# Patient Record
Sex: FEMALE | Race: WHITE | Employment: PART TIME | URBAN - METROPOLITAN AREA
[De-identification: names, ages, dates, MRNs, and addresses within clinical notes are randomized per-mention and may not be internally consistent; named-entity substitution may affect disease eponyms.]

---

## 2017-01-24 ENCOUNTER — TELEPHONE (OUTPATIENT)
Dept: PRIMARY CARE CLINIC | Age: 42
End: 2017-01-24

## 2017-01-27 ENCOUNTER — OFFICE VISIT (OUTPATIENT)
Dept: PRIMARY CARE CLINIC | Age: 42
End: 2017-01-27

## 2017-01-27 VITALS
OXYGEN SATURATION: 100 % | BODY MASS INDEX: 24.05 KG/M2 | HEART RATE: 76 BPM | WEIGHT: 149 LBS | DIASTOLIC BLOOD PRESSURE: 80 MMHG | SYSTOLIC BLOOD PRESSURE: 120 MMHG

## 2017-01-27 DIAGNOSIS — E03.9 ACQUIRED HYPOTHYROIDISM: Primary | ICD-10-CM

## 2017-01-27 DIAGNOSIS — R63.5 WEIGHT GAIN: ICD-10-CM

## 2017-01-27 PROCEDURE — 99214 OFFICE O/P EST MOD 30 MIN: CPT | Performed by: INTERNAL MEDICINE

## 2017-01-28 LAB — TSH SERPL DL<=0.05 MIU/L-ACNC: 3.02 UIU/ML (ref 0.27–4.2)

## 2017-01-29 ENCOUNTER — TELEPHONE (OUTPATIENT)
Dept: PRIMARY CARE CLINIC | Age: 42
End: 2017-01-29

## 2017-01-29 DIAGNOSIS — E03.9 ACQUIRED HYPOTHYROIDISM: Primary | ICD-10-CM

## 2017-01-30 PROBLEM — E03.9 ACQUIRED HYPOTHYROIDISM: Status: ACTIVE | Noted: 2017-01-30

## 2017-01-30 RX ORDER — LEVOTHYROXINE SODIUM 112 UG/1
112 TABLET ORAL DAILY
Qty: 30 TABLET | Refills: 1 | Status: SHIPPED | OUTPATIENT
Start: 2017-01-30 | End: 2017-10-09

## 2017-04-03 DIAGNOSIS — E03.9 ACQUIRED HYPOTHYROIDISM: ICD-10-CM

## 2017-04-04 ENCOUNTER — TELEPHONE (OUTPATIENT)
Dept: PRIMARY CARE CLINIC | Age: 42
End: 2017-04-04

## 2017-04-04 DIAGNOSIS — E03.9 ACQUIRED HYPOTHYROIDISM: Primary | ICD-10-CM

## 2017-04-04 LAB — TSH SERPL DL<=0.05 MIU/L-ACNC: 4.58 UIU/ML (ref 0.27–4.2)

## 2017-04-04 RX ORDER — LEVOTHYROXINE SODIUM 0.12 MG/1
125 TABLET ORAL DAILY
Qty: 30 TABLET | Refills: 1 | Status: SHIPPED | OUTPATIENT
Start: 2017-04-04 | End: 2017-05-31 | Stop reason: SDUPTHER

## 2017-04-05 RX ORDER — CITALOPRAM 40 MG/1
TABLET ORAL
Qty: 90 TABLET | Refills: 1 | Status: SHIPPED | OUTPATIENT
Start: 2017-04-05 | End: 2017-09-20 | Stop reason: SDUPTHER

## 2017-04-18 ENCOUNTER — OFFICE VISIT (OUTPATIENT)
Dept: ORTHOPEDIC SURGERY | Age: 42
End: 2017-04-18

## 2017-04-18 ENCOUNTER — HOSPITAL ENCOUNTER (OUTPATIENT)
Dept: GENERAL RADIOLOGY | Facility: MEDICAL CENTER | Age: 42
Discharge: HOME | End: 2017-04-18
Attending: ORTHOPAEDIC SURGERY | Admitting: ORTHOPAEDIC SURGERY

## 2017-04-18 VITALS
WEIGHT: 145 LBS | HEART RATE: 59 BPM | DIASTOLIC BLOOD PRESSURE: 74 MMHG | HEIGHT: 65 IN | SYSTOLIC BLOOD PRESSURE: 116 MMHG | BODY MASS INDEX: 24.16 KG/M2

## 2017-04-18 DIAGNOSIS — M25.561 CHRONIC PAIN OF RIGHT KNEE: Primary | ICD-10-CM

## 2017-04-18 DIAGNOSIS — M25.562 CHRONIC PAIN OF LEFT KNEE: ICD-10-CM

## 2017-04-18 DIAGNOSIS — G89.29 CHRONIC PAIN OF LEFT KNEE: ICD-10-CM

## 2017-04-18 DIAGNOSIS — M76.32 IT BAND SYNDROME, LEFT: ICD-10-CM

## 2017-04-18 DIAGNOSIS — M22.41 CHONDROMALACIA PATELLA, RIGHT: ICD-10-CM

## 2017-04-18 DIAGNOSIS — M22.2X1 PATELLOFEMORAL ARTHRALGIA OF BOTH KNEES: ICD-10-CM

## 2017-04-18 DIAGNOSIS — M22.42 CHONDROMALACIA PATELLA, LEFT: ICD-10-CM

## 2017-04-18 DIAGNOSIS — M22.2X2 PATELLOFEMORAL ARTHRALGIA OF BOTH KNEES: ICD-10-CM

## 2017-04-18 DIAGNOSIS — G89.29 CHRONIC PAIN OF RIGHT KNEE: ICD-10-CM

## 2017-04-18 DIAGNOSIS — M25.561 CHRONIC PAIN OF RIGHT KNEE: ICD-10-CM

## 2017-04-18 DIAGNOSIS — M76.31 IT BAND SYNDROME, RIGHT: ICD-10-CM

## 2017-04-18 DIAGNOSIS — M62.9 HAMSTRING TIGHTNESS OF BOTH LOWER EXTREMITIES: ICD-10-CM

## 2017-04-18 DIAGNOSIS — G89.29 CHRONIC PAIN OF RIGHT KNEE: Primary | ICD-10-CM

## 2017-04-18 PROCEDURE — 99203 OFFICE O/P NEW LOW 30 MIN: CPT | Performed by: PHYSICIAN ASSISTANT

## 2017-04-18 PROCEDURE — 73562 X-RAY EXAM OF KNEE 3: CPT | Performed by: PHYSICIAN ASSISTANT

## 2017-06-01 DIAGNOSIS — E03.9 ACQUIRED HYPOTHYROIDISM: ICD-10-CM

## 2017-06-01 LAB — TSH SERPL DL<=0.05 MIU/L-ACNC: 2.32 UIU/ML (ref 0.27–4.2)

## 2017-06-01 RX ORDER — LEVOTHYROXINE SODIUM 0.12 MG/1
TABLET ORAL
Qty: 30 TABLET | Refills: 11 | Status: SHIPPED | OUTPATIENT
Start: 2017-06-01 | End: 2017-06-23 | Stop reason: SDUPTHER

## 2017-06-23 RX ORDER — LEVOTHYROXINE SODIUM 0.12 MG/1
TABLET ORAL
Qty: 90 TABLET | Refills: 3 | Status: SHIPPED | OUTPATIENT
Start: 2017-06-23 | End: 2018-08-16 | Stop reason: SDUPTHER

## 2017-06-26 ENCOUNTER — TELEPHONE (OUTPATIENT)
Dept: PRIMARY CARE CLINIC | Age: 42
End: 2017-06-26

## 2017-09-21 RX ORDER — CITALOPRAM 40 MG/1
TABLET ORAL
Qty: 90 TABLET | Refills: 1 | Status: SHIPPED | OUTPATIENT
Start: 2017-09-21 | End: 2018-03-22 | Stop reason: SDUPTHER

## 2017-10-09 ENCOUNTER — OFFICE VISIT (OUTPATIENT)
Dept: PRIMARY CARE CLINIC | Age: 42
End: 2017-10-09

## 2017-10-09 VITALS
HEART RATE: 64 BPM | SYSTOLIC BLOOD PRESSURE: 104 MMHG | BODY MASS INDEX: 25.23 KG/M2 | WEIGHT: 151.6 LBS | DIASTOLIC BLOOD PRESSURE: 62 MMHG

## 2017-10-09 DIAGNOSIS — M25.562 ACUTE PAIN OF BOTH KNEES: Primary | ICD-10-CM

## 2017-10-09 DIAGNOSIS — E03.9 ACQUIRED HYPOTHYROIDISM: ICD-10-CM

## 2017-10-09 DIAGNOSIS — M25.462 EFFUSION, LEFT KNEE: ICD-10-CM

## 2017-10-09 DIAGNOSIS — M25.561 ACUTE PAIN OF BOTH KNEES: Primary | ICD-10-CM

## 2017-10-09 LAB
BASOPHILS ABSOLUTE: 0 K/UL (ref 0–0.2)
BASOPHILS RELATIVE PERCENT: 0.6 %
EOSINOPHILS ABSOLUTE: 0.3 K/UL (ref 0–0.6)
EOSINOPHILS RELATIVE PERCENT: 6.5 %
HCT VFR BLD CALC: 39.5 % (ref 36–48)
HEMOGLOBIN: 13.5 G/DL (ref 12–16)
LYMPHOCYTES ABSOLUTE: 1.2 K/UL (ref 1–5.1)
LYMPHOCYTES RELATIVE PERCENT: 23.3 %
MCH RBC QN AUTO: 31.1 PG (ref 26–34)
MCHC RBC AUTO-ENTMCNC: 34.2 G/DL (ref 31–36)
MCV RBC AUTO: 91.1 FL (ref 80–100)
MONOCYTES ABSOLUTE: 0.4 K/UL (ref 0–1.3)
MONOCYTES RELATIVE PERCENT: 7.7 %
NEUTROPHILS ABSOLUTE: 3.1 K/UL (ref 1.7–7.7)
NEUTROPHILS RELATIVE PERCENT: 61.9 %
PDW BLD-RTO: 12.8 % (ref 12.4–15.4)
PLATELET # BLD: 235 K/UL (ref 135–450)
PMV BLD AUTO: 7.8 FL (ref 5–10.5)
RBC # BLD: 4.34 M/UL (ref 4–5.2)
SEDIMENTATION RATE, ERYTHROCYTE: 8 MM/HR (ref 0–20)
WBC # BLD: 5 K/UL (ref 4–11)

## 2017-10-09 PROCEDURE — 99214 OFFICE O/P EST MOD 30 MIN: CPT | Performed by: INTERNAL MEDICINE

## 2017-10-09 NOTE — PROGRESS NOTES
10/09/17 151 lb 9.6 oz (68.8 kg)   04/18/17 145 lb (65.8 kg)   01/27/17 149 lb (67.6 kg)     BP Readings from Last 3 Encounters:   10/09/17 104/62   04/18/17 116/74   01/27/17 120/80         /62 (Site: Left Arm, Position: Sitting, Cuff Size: Medium Adult)   Pulse 64   Wt 151 lb 9.6 oz (68.8 kg)   BMI 25.23 kg/m²   General appearance: alert, appears stated age and cooperative  Eyes: negative findings: lids and lashes normal and conjunctivae and sclerae normal  Throat: lips, mucosa, and tongue normal; teeth and gums normal  Neck: no adenopathy, supple, symmetrical, trachea midline and thyroid not enlarged, symmetric, no tenderness/mass/nodules  Lungs: clear to auscultation bilaterally  Msk: warm left knee. Small effusion. No erythema. No evidence of synovitis both hands. Antalgic gait  Neurologic: Mental status: Alert, oriented, thought content appropriate  Cranial nerves: normal  Skin: Skin is warm and dry. [place] Psychiatric: normal mood and affect. speech is normal and behavior is normal. Judgment, cognition and memory are normal.     not applicable    Assessment and Plan  1. Acute pain of both knees  Sudden onset 6 months ago-no prior hx and effusion today  Also remote hx of bilateral hand pain  Concerning for inflammatory arthritis  Discussed my concerns with patient  rec f/u with rheum   - XR KNEE LEFT (3 VIEWS)  - XR Knee Right 3VW  - José Smart MD (Rheumatology)    2. Effusion, left knee    - Sedimentation Rate  - C-Reactive Protein  - Comprehensive Metabolic Panel  - CBC Auto Differential  - RHEUMATOID FACTOR  - CYCLIC CITRUL PEPTIDE ANTIBODY, IGG  - XR KNEE LEFT (3 VIEWS)  - José Costa MD (Rheumatology)    3.  Acquired hypothyroidism

## 2017-10-10 LAB
A/G RATIO: 1.8 (ref 1.1–2.2)
ALBUMIN SERPL-MCNC: 4.6 G/DL (ref 3.4–5)
ALP BLD-CCNC: 69 U/L (ref 40–129)
ALT SERPL-CCNC: 12 U/L (ref 10–40)
ANION GAP SERPL CALCULATED.3IONS-SCNC: 14 MMOL/L (ref 3–16)
AST SERPL-CCNC: 17 U/L (ref 15–37)
BILIRUB SERPL-MCNC: 0.3 MG/DL (ref 0–1)
BUN BLDV-MCNC: 10 MG/DL (ref 7–20)
C-REACTIVE PROTEIN: 0.7 MG/L (ref 0–5.1)
CALCIUM SERPL-MCNC: 9.7 MG/DL (ref 8.3–10.6)
CHLORIDE BLD-SCNC: 98 MMOL/L (ref 99–110)
CO2: 27 MMOL/L (ref 21–32)
CREAT SERPL-MCNC: 0.8 MG/DL (ref 0.6–1.1)
GFR AFRICAN AMERICAN: >60
GFR NON-AFRICAN AMERICAN: >60
GLOBULIN: 2.5 G/DL
GLUCOSE BLD-MCNC: 92 MG/DL (ref 70–99)
POTASSIUM SERPL-SCNC: 4 MMOL/L (ref 3.5–5.1)
RHEUMATOID FACTOR: <10 IU/ML
SODIUM BLD-SCNC: 139 MMOL/L (ref 136–145)
TOTAL PROTEIN: 7.1 G/DL (ref 6.4–8.2)

## 2017-10-11 ENCOUNTER — TELEPHONE (OUTPATIENT)
Dept: PRIMARY CARE CLINIC | Age: 42
End: 2017-10-11

## 2017-10-11 LAB — CCP IGG ANTIBODIES: 8 UNITS (ref 0–19)

## 2017-12-12 ENCOUNTER — OFFICE VISIT (OUTPATIENT)
Dept: ORTHOPEDIC SURGERY | Age: 42
End: 2017-12-12

## 2017-12-12 VITALS
DIASTOLIC BLOOD PRESSURE: 67 MMHG | SYSTOLIC BLOOD PRESSURE: 111 MMHG | HEIGHT: 65 IN | HEART RATE: 78 BPM | BODY MASS INDEX: 25.27 KG/M2 | WEIGHT: 151.68 LBS

## 2017-12-12 DIAGNOSIS — G89.29 CHRONIC PAIN OF LEFT KNEE: ICD-10-CM

## 2017-12-12 DIAGNOSIS — M71.21 BAKER'S CYST OF KNEE, RIGHT: ICD-10-CM

## 2017-12-12 DIAGNOSIS — M22.2X2 PATELLOFEMORAL ARTHRALGIA OF BOTH KNEES: ICD-10-CM

## 2017-12-12 DIAGNOSIS — M22.42 CHONDROMALACIA PATELLA, LEFT: ICD-10-CM

## 2017-12-12 DIAGNOSIS — M22.2X1 PATELLOFEMORAL ARTHRALGIA OF BOTH KNEES: ICD-10-CM

## 2017-12-12 DIAGNOSIS — G89.29 CHRONIC PAIN OF RIGHT KNEE: Primary | ICD-10-CM

## 2017-12-12 DIAGNOSIS — M25.561 CHRONIC PAIN OF RIGHT KNEE: Primary | ICD-10-CM

## 2017-12-12 DIAGNOSIS — M25.562 CHRONIC PAIN OF LEFT KNEE: ICD-10-CM

## 2017-12-12 DIAGNOSIS — M22.41 CHONDROMALACIA PATELLA, RIGHT: ICD-10-CM

## 2017-12-12 DIAGNOSIS — M71.22 BAKER'S CYST OF KNEE, LEFT: ICD-10-CM

## 2017-12-12 DIAGNOSIS — M62.9 HAMSTRING TIGHTNESS OF BOTH LOWER EXTREMITIES: ICD-10-CM

## 2017-12-12 PROBLEM — M76.31 IT BAND SYNDROME, RIGHT: Status: ACTIVE | Noted: 2017-12-12

## 2017-12-12 PROBLEM — M76.32 IT BAND SYNDROME, LEFT: Status: ACTIVE | Noted: 2017-12-12

## 2017-12-12 PROCEDURE — 20610 DRAIN/INJ JOINT/BURSA W/O US: CPT | Performed by: PHYSICIAN ASSISTANT

## 2017-12-12 PROCEDURE — 99999 PR OFFICE/OUTPT VISIT,PROCEDURE ONLY: CPT | Performed by: PHYSICIAN ASSISTANT

## 2017-12-12 RX ORDER — MELOXICAM 15 MG/1
15 TABLET ORAL DAILY
Qty: 30 TABLET | Refills: 2 | Status: SHIPPED | OUTPATIENT
Start: 2017-12-12 | End: 2018-03-21

## 2017-12-12 NOTE — PROGRESS NOTES
MG INJ   2. Chondromalacia patella, right  PA ARTHROCENTESIS ASPIR&/INJ MAJOR JT/BURSA W/O US    PA METHYLPREDNISOLONE 40 MG INJ   3. Baker's cyst of knee, right     4. Chronic pain of left knee  PA ARTHROCENTESIS ASPIR&/INJ MAJOR JT/BURSA W/O US    PA METHYLPREDNISOLONE 40 MG INJ   5. Chondromalacia patella, left  PA ARTHROCENTESIS ASPIR&/INJ MAJOR JT/BURSA W/O US    PA METHYLPREDNISOLONE 40 MG INJ   6. Patellofemoral arthralgia of both knees     7. Hamstring tightness of both lower extremities     8. Baker's cyst of knee, left         Her overall course:        []  Responding adequately to ongoing treatment    []  Worsening despite conservative treatment    [x]  Unchanged despite conservative treatment    Plan (Medical Decision Making):      I discussed the diagnosis and the treatment options with Raman Jensen today. In Summary:  1). The various treatment options were outlined and discussed with Raman Jensen including:      [x] Conservative care options:      physical therapy, ice, NSAIDs, bracing, and activity modification       [x] The indications for therapeutic injections Steroids and Hyluronic Acid/Synvisc/Euflexxa/Supartz      [x] The indications for additional imaging/laboratory studies     2). After considering the various options discussed, Raman Jensen elected to pursue a course of treatment that includes the following:      [x] MEDICATIONS/REFILLS prescribed today are listed below. Meloxicam. I did advise her not to take any other anti-inflammatories while taking the meloxicam. We discussed GI precautions. [x] Physical Therapy/HEP Continue HEP, activities as tolerated.       [] Script: 2 x per week x 4 weeks    [x] Ice to affected area: 15-20 minutes 4 x day    [x] Over the Counter/Suppliment Tx     Drinking 6-8 oz of Tart Cherry Juice     Taking Glucosamine and Chondroitin Sulfate (1500 mg/d)     Vit D 3 (at least 2,000 IU/day)    [x] DME's: Knee brace is optional, comfortable shoes are suggested    [x] Injection into both of her knees today. Return to Clinic/Follow - Up:  Mg Rudd was asked to make a follow-up appointment:    [x]  6-8 weeks    Mg Rudd was instructed to call the office if her symptoms worsen or if new symptoms appear prior to the next scheduled visit. She is specifically instructed to contact the office between now & her scheduled appointment if she has concerns related to her condition or if she needs assistance in scheduling the above tests. She is welcome to call for an appointment sooner if she has any additional concerns or questions. PROCEDURE NOTE: BILATERAL KNEE INJECTIONS  12/12/2017 at 5:05 PM   Procedure: Injection  Verbal consent was obtained. Risks and benefits were explained. Questions were encouraged and answered. Timeout Verification Completed including:    Correct patient: Mg Rudd was identified    Correct procedure    Correct site & side    Correct equipment and supplies    Staff member: Sage Ball PA-C      Assistant: Osiris Maciel     Injection Site: Superolateral bilaterally    The injection sites were prepped with Chlora-Prep using aseptic technique and bilateral knee intra-articular injections were performed with ethyl chloride for anesthetic. Depomedrol 2 ml (40 mg/ml = 80 mg total) was injected into each knee. A sterile adhesive dressing was applied to each injection site. Post procedure: Mg Rudd tolerated the treatment well. Instructions to patient:  Appropriate post injections instructions were given to Mg Rudd. Patient Education Materials Provided:  [x] Sage Ball PA-C:  Anatomic Drawings and treatment algorithms    Patient Instructions     Mg Rudd was instructed to apply ice to the injection area for 15 - 20 minutes several times a day to decrease pain and and swelling.      Ice (\"ICE IS YOUR FRIEND\": try using a bag of frozen peas or corn) for 15  20 minutes 3 x \"Start Moving Start Living\" =  Http://startmovingstProLedge Bookkeeping Servicesliving.[employer]       (YOUTUBE video SMSL FULL SD)                VITAMIN D3    Dr Rocío Avelar recommends that you add an over-the-counter supplement of vitamin D3, (at least 2,000 IU daily). Vitamin D3 is widely available without a prescription at pharmacies and buying clubs ([facility], [facility]) and on-line at sites such as Amazon.com. It comes in a variety of formulations (tablets, gelcaps, liquid) and doses (1,000 IU, 2,000 IU, 4,000 IU, 5,000 IU and even higher). The right dose for most people is 2,000 IU per day but higher doses are sometimes needed. We have not seen any problems with any of the formulations so we have no reason to recommend a specific brand. You can take your vitamin D3 at any time of the day, with or without food, with or without calcium. Because vitamin D3 is long acting, if you miss your vitamin D3 on one day you can double up the dose on a later day. Orders Placed This Encounter   Procedures    GA ARTHROCENTESIS ASPIR&/INJ MAJOR JT/BURSA W/O US    GA METHYLPREDNISOLONE 40 MG INJ    GA ARTHROCENTESIS ASPIR&/INJ MAJOR JT/BURSA W/O US    GA METHYLPREDNISOLONE 40 MG INJ       Refills/New Prescriptions:  Orders Placed This Encounter   Medications    meloxicam (MOBIC) 15 MG tablet     Sig: Take 1 tablet by mouth daily Take one tab by mouth every day with food. Dispense:  30 tablet     Refill:  2     Today's prescription medications will be e-scribed (when appropriate) to the Patient's Preferred Pharmacy:   [facility] - [address]  [address]   [facility] 1  [address] [place] [address]  Phone: [phone] Fax: [phone]    [facility], [address] [place] [address] -  [phone]  [address] [place] [address]  Phone: [phone] Fax: [phone]       Ashlyn Rosales PA-C  Electronically signed by Ashlyn Rosales PA-C on

## 2017-12-12 NOTE — PROGRESS NOTES
DEPO-MEDROL CORTISONE INJECTION  NDC# [address]-95  LOT# [id number]  EXP.  DATE: 4/2020  SITE: Bilateral knee

## 2017-12-12 NOTE — PATIENT INSTRUCTIONS
Jaron Bautista was instructed to apply ice to the injection area for 15 - 20 minutes several times a day to decrease pain and and swelling. Ice (\"ICE IS YOUR FRIEND\": try using a bag of frozen peas or corn) for 15  20 minutes 3 x day. Limit activities today. You may resume normal activities tomorrow if you have no pain. For severe pain:  If after hours, she is to go to Emergency Room. During office hours she must come in to the office. Jaron Bautista was instructed to call the office if there are any questions or concerns related to her condition. I have asked her to schedule a follow-up appointment for 6-8 weeks from now for re-evaluation and possible repeat injection. She is specifically instructed to contact the office between now & her scheduled appointment if she has concerns related to her condition. She is welcome to call for an appointment sooner if she has any additional concerns or questions. General Medication Instructions:  Any prescriptions must be used as directed. If you have any concerns, questions or require refills, please contact our office. If you experience any adverse reactions, stop the medication and call our office immediately. If you designate a preferred pharmacy, appropriate prescriptions will be sent to your preferred pharmacy for pickup to be use as directed. Patient Driving Instructions:  No driving if you are taking narcotic pain medications or muscle relaxers. PATIENT REMINDER:   Carry a list of your medications and allergies with you at all times. Call your pharmacy and our office at least 1 week in advance to refill prescriptions. Narcotic medications will not be refilled after hours or on weekends. ATTENTION    As of October 2, 2014, the [facility] ([facility]) has mandated that ALL PRESCRIPTION PAIN MEDICINE cannot be called into your pharmacy.     This includes:    Oxycodone (Percocet)  Hydrocodone (Vicodin,

## 2017-12-12 NOTE — LETTER
Luiza Santiago MD   levothyroxine (SYNTHROID) 125 MCG tablet TAKE 1 TABLET BY MOUTH DAILY Yes Ramiro Iglesias MD   fluticasone (FLONASE) 50 MCG/ACT nasal spray 2 sprays each nostril daily Yes Ramiro Iglesias MD   ciprofloxacin (CILOXAN) 0.3 % ophthalmic solution Apply 4 drops left ear bid for 7 days Yes Ramiro Iglesias MD   calcium carbonate (CALCIUM 600) 600 MG TABS tablet Take 1 tablet by mouth daily Yes Ramiro Iglesias MD     Social History     Social History    Marital status:      Spouse name: N/A    Number of children: N/A    Years of education: N/A     Occupational History    Not on file. Social History Main Topics    Smoking status: Never Smoker    Smokeless tobacco: Never Used    Alcohol use 0.5 oz/week     1 drink(s) per week    Drug use: No    Sexual activity: Yes     Partners: Male     Other Topics Concern    Not on file     Social History Narrative    9,7,1 yo        babysits toddler nieces and nehews             No family history on file. Review of Systems (ROS):    Performed. Katherine Gambinoles's review of systems has been performed by intake and observation. The most recent ROS was scanned into the media tab of the chart on 4/18/2017. She has been instructed to contact her primary care physician regarding ROS issues if not already being addressed at this time. There are no recent changes. Objective:   Physical Exam  Vital Signs:  /67   Pulse 78   Ht 5' 5\" (1.651 m)   Wt 151 lb 10.8 oz (68.8 kg)   BMI 25.24 kg/m²      Constitution:  Generally, Beatris Ng is  alert,  appears stated age, and  in no distress.   Her general body habitus is  Cachectic   Thin   Normal   Obese   Morbidly Obese    Head:  Normocephalic  Eyes:  Extra-occular muscles intact    Left Ear:  External Ear normal   Right Ear:  External Ear normal   Nose:  Normal  Mouth:  Oral mucosa moist   No perioral lesions    Pulmonary:  Respirations unlabored and regular Skin:  Warm  Well perfused     Psychiatric:    Good judgement and insight   Oriented to  person,  place, and  time. Mood appropriate for circumstances. Gait:   Gait is  Normal   Impaired  Assistive Device:  None   Knee Brace   Cane   Crutches    Walker    Wheelchair   Other     ORTHOPAEDIC KNEE EXAM: BILATERAL    Inspection:   Skin intact without abrasion or lacerations. Ecchymosis:   none   mild   moderate   severe   Atrophy:   none   mild   moderate   severe   Effusion:  none   mild   moderate   severe   Scar / Surgical incision(s): A-Scope Portals   Open Surgical Incision(s)    Alignment:   Normal   Varus  Valgus    Range of Motion:   Normal Knee ROM          Deferred: acute injury/post-surgery/pain    Limited ROM    Palpation:    No Tenderness   Tenderness: Anterior and lateral  mild   moderate   severe    Patellofemoral Crepitation:   none   mild   moderate   severe   Baker's Cyst:   none   small, bilaterally   medium   large   Lorelei's Sign:  negative   positive    Provocative Tests:    Negative: Meniscal testing, ligament stress testing, patellar apprehension  Positive Tests:   Meniscus Testing:    Medial Miguelito's Test (MMT)    Lateral Miguelito's Test (LMT)        Instability Testing:    Lachman (ACL)    Posterior Drawer (PCL)    Valgus Stress in Extension (MCL)    Valgus Stress in 30º of Flexion (MCL)    Varus Stress Test (LCL)        Patella Apprehension    General Ligament Laxity     Provocative Tests: BILATERAL HIP(S)   Negative: Logroll  Positive Tests:   Log Roll     Motor Function:    No gross motor weakness   Motor weakness:  mild   moderate   severe     Neurologic:   Sensation to light touch intact    Coordination / proprioception intact    Circulation:   The limb is warm and well perfused   Capillary refill is intact. Edema   none   mild   moderate   severe   Venous stasis changes   none   mild   moderate   severe    Data Reviewed:     No imaging studies were obtained today.     XRays: (3 views: AP, lateral and patella views) of her left knee taken 4/18/17 showed mild degenerative changes in the patellofemoral compartment. There is neutral alignment. (3 views: AP, lateral and patella views) of her right knee taken 4/18/17 showed mild degenerative changes in the patellofemoral compartment. .  There is neutral alignment.      Assessment (Medical Decision Making): Crow Zhao is a 40y.o. year old female with the following diagnosis:    1. Chronic pain of right knee  CO ARTHROCENTESIS ASPIR&/INJ MAJOR JT/BURSA W/O US    CO METHYLPREDNISOLONE 40 MG INJ   2. Chondromalacia patella, right  CO ARTHROCENTESIS ASPIR&/INJ MAJOR JT/BURSA W/O US    CO METHYLPREDNISOLONE 40 MG INJ   3. Baker's cyst of knee, right     4. Chronic pain of left knee  CO ARTHROCENTESIS ASPIR&/INJ MAJOR JT/BURSA W/O US    CO METHYLPREDNISOLONE 40 MG INJ   5. Chondromalacia patella, left  CO ARTHROCENTESIS ASPIR&/INJ MAJOR JT/BURSA W/O US    CO METHYLPREDNISOLONE 40 MG INJ   6. Patellofemoral arthralgia of both knees     7. Hamstring tightness of both lower extremities     8. Baker's cyst of knee, left         Her overall course:          Responding adequately to ongoing treatment      Worsening despite conservative treatment      Unchanged despite conservative treatment    Plan (Medical Decision Making):      I discussed the diagnosis and the treatment options with Crow Zhao today. In Summary:  1). The various treatment options were outlined and discussed with Crow Zhao including:       Conservative care options:      physical therapy, ice, NSAIDs, bracing, and activity modification        The indications for therapeutic injections Steroids and Hyluronic Acid/Synvisc/Euflexxa/Supartz       The indications for additional imaging/laboratory studies     2).  After considering the various options discussed, Crow Zhao elected to pursue a course of treatment that includes the following: MEDICATIONS/REFILLS prescribed today are listed below. Meloxicam. I did advise her not to take any other anti-inflammatories while taking the meloxicam. We discussed GI precautions. Physical Therapy/HEP Continue HEP, activities as tolerated. Script: 2 x per week x 4 weeks     Ice to affected area: 15-20 minutes 4 x day     Over the Counter/Suppliment Tx     Drinking 6-8 oz of Tart Cherry Juice     Taking Glucosamine and Chondroitin Sulfate (1500 mg/d)     Vit D 3 (at least 2,000 IU/day)     DME's: Knee brace is optional, comfortable shoes are suggested     Injection into both of her knees today. Return to Clinic/Follow - Up:  Hattie Barger was asked to make a follow-up appointment:      6-8 weeks    Hattie Barger was instructed to call the office if her symptoms worsen or if new symptoms appear prior to the next scheduled visit. She is specifically instructed to contact the office between now & her scheduled appointment if she has concerns related to her condition or if she needs assistance in scheduling the above tests. She is welcome to call for an appointment sooner if she has any additional concerns or questions. PROCEDURE NOTE: BILATERAL KNEE INJECTIONS  12/12/2017 at 5:05 PM   Procedure: Injection  Verbal consent was obtained. Risks and benefits were explained. Questions were encouraged and answered. Timeout Verification Completed including:    Correct patient: Hattie Barger was identified    Correct procedure    Correct site & side    Correct equipment and supplies    Staff member: Nikolay Prescott PA-C      Assistant: Ben Maciel     Injection Site: Superolateral bilaterally    The injection sites were prepped with Chlora-Prep using aseptic technique and bilateral knee intra-articular injections were performed with ethyl chloride for anesthetic. Depomedrol 2 ml (40 mg/ml = 80 mg total) was injected into each knee.   A sterile adhesive dressing was applied to each Sig: Take 1 tablet by mouth daily Take one tab by mouth every day with food. Dispense:  30 tablet     Refill:  2     Today's prescription medications will be e-scribed (when appropriate) to the Patient's Preferred Pharmacy:   [facility] - [address]  [address] [place] [address]  [address] [address]  Phone: [phone] Fax: [phone]    [facility], [address] - [phone]  [address] [place] [address] [address] [place] [address]  Phone: [phone] Fax: [phone]       Kellie Strickland PA-C  Electronically signed by Kellie Strickland PA-C on 12/12/2017 at 5:03 PM     Contact Information:  Roel Mujica, [address] [facility] Staff  [phone], Option 3    This dictation was performed with a verbal recognition program (DRAGON) and it was checked for errors. It is possible that there are still dictated errors within this office note. If so, please bring any errors to my attention for an addendum. All efforts were made to ensure that this office note is accurate. I have personally performed and/or participated in the history, physical exam and medical decision making and reviewed all pertinent clinical information unless otherwise noted.

## 2018-01-02 RX ORDER — LEVOTHYROXINE SODIUM 112 UG/1
112 TABLET ORAL DAILY
Qty: 30 TABLET | Refills: 5 | Status: SHIPPED | OUTPATIENT
Start: 2018-01-02 | End: 2018-01-24 | Stop reason: SDUPTHER

## 2018-01-24 RX ORDER — LEVOTHYROXINE SODIUM 112 UG/1
112 TABLET ORAL DAILY
Qty: 30 TABLET | Refills: 5 | Status: SHIPPED | OUTPATIENT
Start: 2018-01-24 | End: 2018-01-26 | Stop reason: SDUPTHER

## 2018-01-26 RX ORDER — LEVOTHYROXINE SODIUM 112 UG/1
112 TABLET ORAL DAILY
Qty: 90 TABLET | Refills: 1 | Status: SHIPPED | OUTPATIENT
Start: 2018-01-26 | End: 2018-03-21

## 2018-03-21 ENCOUNTER — OFFICE VISIT (OUTPATIENT)
Dept: ORTHOPEDIC SURGERY | Age: 43
End: 2018-03-21

## 2018-03-21 VITALS
HEIGHT: 65 IN | SYSTOLIC BLOOD PRESSURE: 115 MMHG | HEART RATE: 65 BPM | DIASTOLIC BLOOD PRESSURE: 75 MMHG | BODY MASS INDEX: 25.27 KG/M2 | WEIGHT: 151.68 LBS

## 2018-03-21 DIAGNOSIS — S83.241A ACUTE MEDIAL MENISCUS TEAR OF RIGHT KNEE, INITIAL ENCOUNTER: Primary | ICD-10-CM

## 2018-03-21 PROCEDURE — 99213 OFFICE O/P EST LOW 20 MIN: CPT | Performed by: ORTHOPAEDIC SURGERY

## 2018-03-21 NOTE — PROGRESS NOTES
[address] patient from previous partner  Knee Pain    Date:  3/21/2018    Name:  Lester Smith  Address:  [facility] [address] [id number]    :  1975      Age:   40 y.o.    SSN:  [id number]      Medical Record Number:  [id number]    Chief Complaint:  Knee Pain (new patient, bilateral knee pain for about 8 months, referred by Dr Harriet Gooden, states her right knee buckled on her about a month ago, right knee is worse, she does YOGA daily)      HPI:   Lester Smith is a healthy and well appearing 40y.o. year old female who presents to our office today complaining of  bilateral knee pain. She is a previous patient of Dr. Harriet Gooden, who treated her for bilateral iliotibial band syndrome about the knee as well as bilateral patellar chondromalacia. She underwent out to physical therapy about 8-11 months ago. She then underwent a corticosteroid injection in bilateral knees in December. She has never had complete relief of her pain. She states her pain is different down her right knee and is acutely become worse. Her left knee pain as a same as it was at her baseline. Her right knee pain is causing her knee to buckle and she feels occasionally is catching on her. She also endorses occasional visible swelling in her right knee greater than her left. She denies any traumas falls or acute twisting injuries where she had sudden onset of worsening pain. Pain Assessment  Location of Pain: Knee  Location Modifiers: Left, Right  Severity of Pain: 8  Quality of Pain: Buckling, Sharp  Duration of Pain: Persistent  Frequency of Pain: Constant  Limiting Behavior: Yes  Result of Injury: No  Work-Related Injury: No  Are there other pain locations you wish to document?: No    Review of Systems:  A 14 point review of systems available in the scanned medical record, dated today, under the media tab, as documented by the patient.   The review is negative with the exception concerns. Chetna Estrada MD  Board Certified Orthopaedic Surgeon  Fellow, Sports Medicine and Arthroscopic [address]  Date:    3/21/2018 Attestation:  I was physically present and performed my own examination of this patient and have discussed the case, including pertinent history and exam findings with the fellow. I agree with the documented assessment and plan. Kristen العلي.  Na Mcmullen MD

## 2018-03-22 RX ORDER — CITALOPRAM 40 MG/1
TABLET ORAL
Qty: 90 TABLET | Refills: 1 | Status: SHIPPED | OUTPATIENT
Start: 2018-03-22 | End: 2018-09-18 | Stop reason: SDUPTHER

## 2018-03-23 RX ORDER — CITALOPRAM 40 MG/1
TABLET ORAL
Qty: 90 TABLET | Refills: 1 | OUTPATIENT
Start: 2018-03-23

## 2018-08-05 PROBLEM — M22.40 CHONDROMALACIA PATELLAE: Status: ACTIVE | Noted: 2017-12-12

## 2018-09-18 RX ORDER — CITALOPRAM 40 MG/1
TABLET ORAL
Qty: 90 TABLET | Refills: 1 | Status: SHIPPED | OUTPATIENT
Start: 2018-09-18 | End: 2018-11-08 | Stop reason: SDUPTHER

## 2018-10-17 ENCOUNTER — TELEPHONE (OUTPATIENT)
Dept: PRIMARY CARE CLINIC | Age: 43
End: 2018-10-17

## 2018-10-17 DIAGNOSIS — E03.9 ACQUIRED HYPOTHYROIDISM: Primary | ICD-10-CM

## 2018-10-17 DIAGNOSIS — Z00.00 ROUTINE PHYSICAL EXAMINATION: ICD-10-CM

## 2018-10-17 NOTE — TELEPHONE ENCOUNTER
I ordered it in addition to cholesterol and blood sugar  I would like to see her for physical in next month please  I have not seen her for a year

## 2018-10-18 DIAGNOSIS — E03.9 ACQUIRED HYPOTHYROIDISM: ICD-10-CM

## 2018-10-18 DIAGNOSIS — Z00.00 ROUTINE PHYSICAL EXAMINATION: ICD-10-CM

## 2018-10-18 LAB
CHOLESTEROL, TOTAL: 237 MG/DL (ref 0–199)
HDLC SERPL-MCNC: 84 MG/DL (ref 40–60)
LDL CHOLESTEROL CALCULATED: 134 MG/DL
TRIGL SERPL-MCNC: 95 MG/DL (ref 0–150)
TSH SERPL DL<=0.05 MIU/L-ACNC: 2.44 UIU/ML (ref 0.27–4.2)
VLDLC SERPL CALC-MCNC: 19 MG/DL

## 2018-10-19 LAB
ESTIMATED AVERAGE GLUCOSE: 99.7 MG/DL
HBA1C MFR BLD: 5.1 %

## 2018-11-08 ENCOUNTER — OFFICE VISIT (OUTPATIENT)
Dept: PRIMARY CARE CLINIC | Age: 43
End: 2018-11-08
Payer: COMMERCIAL

## 2018-11-08 VITALS
RESPIRATION RATE: 16 BRPM | SYSTOLIC BLOOD PRESSURE: 112 MMHG | OXYGEN SATURATION: 99 % | WEIGHT: 156.6 LBS | HEIGHT: 65 IN | DIASTOLIC BLOOD PRESSURE: 70 MMHG | HEART RATE: 79 BPM | TEMPERATURE: 98.3 F | BODY MASS INDEX: 26.09 KG/M2

## 2018-11-08 DIAGNOSIS — Z00.00 ROUTINE PHYSICAL EXAMINATION: Primary | ICD-10-CM

## 2018-11-08 DIAGNOSIS — S83.206A TEAR OF MENISCUS OF RIGHT KNEE AS CURRENT INJURY, UNSPECIFIED MENISCUS, UNSPECIFIED TEAR TYPE, INITIAL ENCOUNTER: ICD-10-CM

## 2018-11-08 DIAGNOSIS — E03.9 ACQUIRED HYPOTHYROIDISM: ICD-10-CM

## 2018-11-08 PROCEDURE — 90471 IMMUNIZATION ADMIN: CPT | Performed by: INTERNAL MEDICINE

## 2018-11-08 PROCEDURE — 99396 PREV VISIT EST AGE 40-64: CPT | Performed by: INTERNAL MEDICINE

## 2018-11-08 PROCEDURE — 90686 IIV4 VACC NO PRSV 0.5 ML IM: CPT | Performed by: INTERNAL MEDICINE

## 2018-11-08 RX ORDER — CITALOPRAM 40 MG/1
TABLET ORAL
Qty: 90 TABLET | Refills: 3 | Status: SHIPPED | OUTPATIENT
Start: 2018-11-08

## 2018-11-08 RX ORDER — BUPROPION HYDROCHLORIDE 150 MG/1
150 TABLET ORAL EVERY MORNING
Qty: 90 TABLET | Refills: 3 | Status: SHIPPED | OUTPATIENT
Start: 2018-11-08

## 2018-11-08 RX ORDER — LEVOTHYROXINE SODIUM 0.12 MG/1
TABLET ORAL
Qty: 90 TABLET | Refills: 0 | Status: SHIPPED | OUTPATIENT
Start: 2018-11-08 | End: 2018-11-15

## 2018-11-08 ASSESSMENT — PATIENT HEALTH QUESTIONNAIRE - PHQ9
SUM OF ALL RESPONSES TO PHQ QUESTIONS 1-9: 0
SUM OF ALL RESPONSES TO PHQ QUESTIONS 1-9: 0
SUM OF ALL RESPONSES TO PHQ9 QUESTIONS 1 & 2: 0
2. FEELING DOWN, DEPRESSED OR HOPELESS: 0
1. LITTLE INTEREST OR PLEASURE IN DOING THINGS: 0

## 2018-11-15 RX ORDER — LEVOTHYROXINE SODIUM 137 UG/1
137 TABLET ORAL DAILY
Qty: 90 TABLET | Refills: 1 | Status: SHIPPED | OUTPATIENT
Start: 2018-11-15

## 2018-11-15 NOTE — TELEPHONE ENCOUNTER
125 mcg was the higher dose  She was previously on 112 mcg  She never picked up the 125 mcg last time when I saw her  If she has actually been taking 125 mcg for past 2 months, I want for her to have her thyroid blood test today and then I will send refill based on results

## 2019-01-15 ENCOUNTER — OFFICE VISIT (OUTPATIENT)
Dept: ORTHOPEDIC SURGERY | Age: 44
End: 2019-01-15
Payer: COMMERCIAL

## 2019-01-15 VITALS — BODY MASS INDEX: 24.16 KG/M2 | HEIGHT: 65 IN | WEIGHT: 145 LBS

## 2019-01-15 DIAGNOSIS — M25.561 RIGHT KNEE PAIN, UNSPECIFIED CHRONICITY: Primary | ICD-10-CM

## 2019-01-15 DIAGNOSIS — S83.241A TEAR OF MEDIAL MENISCUS OF RIGHT KNEE, CURRENT, UNSPECIFIED TEAR TYPE, INITIAL ENCOUNTER: ICD-10-CM

## 2019-01-15 PROCEDURE — 99243 OFF/OP CNSLTJ NEW/EST LOW 30: CPT | Performed by: ORTHOPAEDIC SURGERY

## 2019-01-24 DIAGNOSIS — M22.40 CHONDROMALACIA OF PATELLA, UNSPECIFIED LATERALITY: Primary | ICD-10-CM

## 2019-01-24 RX ORDER — MELOXICAM 15 MG/1
15 TABLET ORAL DAILY
Qty: 30 TABLET | Refills: 3 | Status: SHIPPED | OUTPATIENT
Start: 2019-01-24

## 2019-01-30 ENCOUNTER — TELEPHONE (OUTPATIENT)
Dept: ORTHOPEDIC SURGERY | Age: 44
End: 2019-01-30

## 2019-04-04 DIAGNOSIS — S83.241A ACUTE MEDIAL MENISCUS TEAR OF RIGHT KNEE, INITIAL ENCOUNTER: Primary | ICD-10-CM

## 2022-03-29 ENCOUNTER — HOSPITAL ENCOUNTER (OUTPATIENT)
Dept: MAMMOGRAPHY | Age: 47
Discharge: HOME | End: 2022-04-03
Payer: COMMERCIAL

## 2022-03-29 VITALS — BODY MASS INDEX: 24.16 KG/M2 | WEIGHT: 145 LBS | HEIGHT: 65 IN

## 2022-03-29 DIAGNOSIS — Z12.31 VISIT FOR SCREENING MAMMOGRAM: ICD-10-CM

## 2022-03-29 PROCEDURE — 77067 SCR MAMMO BI INCL CAD: CPT

## 2025-04-08 ENCOUNTER — HOSPITAL ENCOUNTER (OUTPATIENT)
Dept: MAMMOGRAPHY | Age: 50
Discharge: HOME | End: 2025-04-13
Payer: COMMERCIAL

## 2025-04-08 VITALS — HEIGHT: 65 IN | WEIGHT: 140 LBS | BODY MASS INDEX: 23.32 KG/M2

## 2025-04-08 DIAGNOSIS — Z12.31 VISIT FOR SCREENING MAMMOGRAM: ICD-10-CM

## 2025-04-08 PROCEDURE — 77067 SCR MAMMO BI INCL CAD: CPT
